# Patient Record
Sex: FEMALE | Race: WHITE | NOT HISPANIC OR LATINO
[De-identification: names, ages, dates, MRNs, and addresses within clinical notes are randomized per-mention and may not be internally consistent; named-entity substitution may affect disease eponyms.]

---

## 2023-03-21 PROBLEM — Z00.00 ENCOUNTER FOR PREVENTIVE HEALTH EXAMINATION: Status: ACTIVE | Noted: 2023-03-21

## 2023-03-22 ENCOUNTER — APPOINTMENT (OUTPATIENT)
Dept: ORTHOPEDIC SURGERY | Facility: CLINIC | Age: 68
End: 2023-03-22

## 2023-03-29 ENCOUNTER — APPOINTMENT (OUTPATIENT)
Dept: ORTHOPEDIC SURGERY | Facility: CLINIC | Age: 68
End: 2023-03-29
Payer: MEDICARE

## 2023-03-29 VITALS — BODY MASS INDEX: 26.8 KG/M2 | HEIGHT: 64 IN | WEIGHT: 157 LBS

## 2023-03-29 DIAGNOSIS — Z78.9 OTHER SPECIFIED HEALTH STATUS: ICD-10-CM

## 2023-03-29 PROCEDURE — L3809: CPT

## 2023-03-29 PROCEDURE — 26600 TREAT METACARPAL FRACTURE: CPT

## 2023-03-29 PROCEDURE — 73130 X-RAY EXAM OF HAND: CPT | Mod: RT

## 2023-03-29 PROCEDURE — 73110 X-RAY EXAM OF WRIST: CPT | Mod: LT

## 2023-03-29 PROCEDURE — 29280 STRAPPING OF HAND OR FINGER: CPT | Mod: RT

## 2023-03-29 PROCEDURE — 99204 OFFICE O/P NEW MOD 45 MIN: CPT | Mod: 57

## 2023-03-29 NOTE — IMAGING
[de-identified] : right wrist:\par ulnar sided swelling/ecchymosis\par ttp over base of 5th MC\par farom\par normal cascade\par nvid\par \par left thumb base with shoulder deformity at the cmc.  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

## 2023-03-29 NOTE — HISTORY OF PRESENT ILLNESS
[Sudden] : sudden [Constant] : constant [Sleep] : sleep [de-identified] : 3/29/23:  h/o multiple intermittent episodes of blacking out- fell 10 days ago and injured right hand- CT at hospital showed 5th MC bases fracture [] : no [FreeTextEntry1] : right wrist  [FreeTextEntry3] : 3/16/23 [FreeTextEntry5] : patient fell outside on the sidewalk. patient went to [facility] and had xrays and ct for the wrist.

## 2023-04-26 ENCOUNTER — APPOINTMENT (OUTPATIENT)
Dept: ORTHOPEDIC SURGERY | Facility: CLINIC | Age: 68
End: 2023-04-26
Payer: MEDICARE

## 2023-04-26 VITALS — WEIGHT: 157 LBS | BODY MASS INDEX: 26.8 KG/M2 | HEIGHT: 64 IN

## 2023-04-26 DIAGNOSIS — M18.12 UNILATERAL PRIMARY OSTEOARTHRITIS OF FIRST CARPOMETACARPAL JOINT, LEFT HAND: ICD-10-CM

## 2023-04-26 DIAGNOSIS — S62.346A NONDISPLACED FRACTURE OF BASE OF FIFTH METACARPAL BONE, RIGHT HAND, INITIAL ENCOUNTER FOR CLOSED FRACTURE: ICD-10-CM

## 2023-04-26 PROCEDURE — 99213 OFFICE O/P EST LOW 20 MIN: CPT | Mod: 24

## 2023-04-26 PROCEDURE — 20605 DRAIN/INJ JOINT/BURSA W/O US: CPT | Mod: 58,LT

## 2023-04-26 PROCEDURE — 73130 X-RAY EXAM OF HAND: CPT | Mod: RT

## 2023-04-26 NOTE — IMAGING
[Right] : right hand [The fracture is in acceptable alignment. There is progression in healing seen] : The fracture is in acceptable alignment. There is progression in healing seen [de-identified] : right wrist:\par ulnar sided swelling/ecchymosis\par no longer with ttp over base of 5th MC\par farom\par normal cascade\par nvid\par \par left thumb base with shoulder deformity at the cmc.  Mild TTP and positive basal grind at the cmc.\par median/ulnar/radial serve sensation intact\par ain/pin/ulnar motor intact\par palpable pulsres\par CR<2s\par full active range of motion otherwise\par \par  [FreeTextEntry1] : right 5th MC is healed in acceptable alignment.

## 2023-04-26 NOTE — ASSESSMENT
[FreeTextEntry1] : The patient was advised of the diagnosis. The natural history of the pathology was explained in full to the patient in layman's terms. All questions were answered. The risks and benefits of surgical and non-surgical treatment alternatives were explained in full to the patient.\par \par Provided left 1st cmc joint CSI today.\par After a discussion of the risks, benefits and alternatives along with the expectations, the patient was amenable to injection.  The indication for injection is pain, inflammation and radiographically confirmed arthritis.  The skin overlying the carpometacarpal joint was prepared with alcohol and ethyl chloride was sprayed topically.  Sterile technique was used. An injection of 1ml of lidocaine and 6mg of betamethasone was used.  The patient was instructed to call if redness, pain or fever occur.  They may apply ice for 15 minutes eery hour for the next 12-24 hours as tolerated.  .  The patient understands that it may take 2-5 days to see a noticeable difference.  Sterile Band-Aid was applied.\par Continue prn nsaid / gamekeeper brace.\par \par Fracture healed, will rto in 2 mos for final xray of the left hand and examination. \par

## 2023-04-26 NOTE — HISTORY OF PRESENT ILLNESS
[Dull/Aching] : dull/aching [Localized] : localized [Sudden] : sudden [Constant] : constant [Sleep] : sleep [de-identified] : 4/26/2023: pt here for f/u of left 1st cmc joint OA and right 5th MC fracture. \par \par 3/29/23:  h/o multiple intermittent episodes of blacking out- fell 10 days ago and injured right hand- CT at hospital showed 5th MC bases fracture [] : no [FreeTextEntry1] : right wrist  [FreeTextEntry3] : 3/16/23 [FreeTextEntry5] : patient fell outside on the sidewalk. patient went to [facility] and had xrays and ct for the wrist.

## 2023-06-28 ENCOUNTER — APPOINTMENT (OUTPATIENT)
Dept: ORTHOPEDIC SURGERY | Facility: CLINIC | Age: 68
End: 2023-06-28